# Patient Record
Sex: MALE | Race: WHITE | NOT HISPANIC OR LATINO | Employment: FULL TIME | URBAN - METROPOLITAN AREA
[De-identification: names, ages, dates, MRNs, and addresses within clinical notes are randomized per-mention and may not be internally consistent; named-entity substitution may affect disease eponyms.]

---

## 2019-12-16 ENCOUNTER — OFFICE VISIT (OUTPATIENT)
Dept: CARDIOLOGY CLINIC | Facility: CLINIC | Age: 64
End: 2019-12-16
Payer: COMMERCIAL

## 2019-12-16 VITALS
WEIGHT: 190.48 LBS | BODY MASS INDEX: 27.27 KG/M2 | DIASTOLIC BLOOD PRESSURE: 90 MMHG | SYSTOLIC BLOOD PRESSURE: 152 MMHG | HEIGHT: 70 IN | HEART RATE: 103 BPM

## 2019-12-16 DIAGNOSIS — I10 ESSENTIAL HYPERTENSION: ICD-10-CM

## 2019-12-16 DIAGNOSIS — R00.0 TACHYCARDIA: Primary | ICD-10-CM

## 2019-12-16 PROBLEM — J45.20 MILD INTERMITTENT ASTHMA WITHOUT COMPLICATION: Status: ACTIVE | Noted: 2019-12-16

## 2019-12-16 PROCEDURE — 99243 OFF/OP CNSLTJ NEW/EST LOW 30: CPT | Performed by: INTERNAL MEDICINE

## 2019-12-16 RX ORDER — BIOTIN 1 MG
TABLET ORAL
COMMUNITY
Start: 2016-01-21

## 2019-12-16 RX ORDER — AMLODIPINE BESYLATE 2.5 MG/1
TABLET ORAL
COMMUNITY
Start: 2018-10-15

## 2019-12-16 RX ORDER — ASPIRIN 81 MG/1
81 TABLET ORAL DAILY
COMMUNITY

## 2019-12-16 RX ORDER — BUDESONIDE AND FORMOTEROL FUMARATE DIHYDRATE 160; 4.5 UG/1; UG/1
AEROSOL RESPIRATORY (INHALATION)
COMMUNITY
Start: 2016-04-21
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
## 2019-12-16 NOTE — PATIENT INSTRUCTIONS
I would recommend continued monitoring of heart rate/pulse  If your heart rate/pulse remains consistently over 100 at rest as of next Monday please give the office a call to schedule a 24 hour Holter monitor  Blood pressure is mildly elevated on exam   I would recommend continuing to monitor blood pressure intermittently at home  I would recommend taking blood pressure cuff along to next office visit to ensure the calibration is correct with Dr Janice Connor

## 2019-12-17 NOTE — ASSESSMENT & PLAN NOTE
Blood pressure is elevated today on exam   Patient appears somewhat anxious on exam    Blood pressures at home per his report are in the 130/80 range  I have made no changes to his regimen but would recommend close follow up with PCP pending improvement in his heart rate to determine if additional blood pressure medication is needed  Please see recommendations for BP control under tachycardia above

## 2019-12-17 NOTE — ASSESSMENT & PLAN NOTE
Patient presents for evaluation of tachycardia  HR on exam today 103 BPM    I suspect this is reactive tachycardia and given his normal echocardiogram findings I have recommended conservative management for now  I have asked him to monitor his heart rate over the next 7 days and to call me if his heart rates remain consistently in the low 100's  If he remains tachycardic, I would plan 24 hour Holter monitor  Pending Holter monitor results and subsequent blood pressure readings I may consider low dose beta blocker (would need to do this cautiously given history of asthma) or calcium channel blocker to lower heart rate and to obtain more adequate blood pressure control  If heart rates improve over the next 7 days I would hold off on any further evaluation  This was explained to the patient in detail